# Patient Record
Sex: MALE
[De-identification: names, ages, dates, MRNs, and addresses within clinical notes are randomized per-mention and may not be internally consistent; named-entity substitution may affect disease eponyms.]

---

## 2024-08-14 ENCOUNTER — APPOINTMENT (OUTPATIENT)
Dept: ORTHOPEDIC SURGERY | Facility: CLINIC | Age: 34
End: 2024-08-14
Payer: MEDICAID

## 2024-08-14 VITALS — WEIGHT: 154 LBS | HEIGHT: 67 IN | BODY MASS INDEX: 24.17 KG/M2

## 2024-08-14 DIAGNOSIS — M67.431 GANGLION, RIGHT WRIST: ICD-10-CM

## 2024-08-14 PROBLEM — Z00.00 ENCOUNTER FOR PREVENTIVE HEALTH EXAMINATION: Status: ACTIVE | Noted: 2024-08-14

## 2024-08-14 PROCEDURE — 20612 ASPIRATE/INJ GANGLION CYST: CPT | Mod: RT

## 2024-08-14 PROCEDURE — 99203 OFFICE O/P NEW LOW 30 MIN: CPT | Mod: 25

## 2024-08-14 NOTE — DISCUSSION/SUMMARY
[FreeTextEntry1] : He has findings consistent with right dorsal wrist ganglion cyst.    I had a discussion with the patient regarding today's visit, the prognosis of this diagnosis, and treatment recommendations and options. At this time, I recommended a cyst aspiration today, which he agreed to proceed with.    The patient has agreed to the above plan of management and has expressed full understanding. All questions were fully answered to the patient's satisfaction.   My cumulative time spent on this visit included: Preparation for the visit, review of the medical records, review of pertinent diagnostic studies, examination and counseling of the patient on the above diagnosis, treatment plan and prognosis, orders of diagnostic tests, medication and/or appropriate procedures and documentation in the medical records of today's visit.

## 2024-08-14 NOTE — PROCEDURE
[FreeTextEntry1] : Using sterile technique, the ganglion cyst was aspirated. He was instructed on activity modification. He understands the relatively high risk of recurrence. If it recurs and causes him symptoms, then he will return to the office to discuss further treatment options.

## 2024-08-14 NOTE — HISTORY OF PRESENT ILLNESS
[Right] : right hand dominant [FreeTextEntry1] : He comes in today for evaluation of  right wrist ganglion cyst for the past 2 weeks. He states his pain varies with certain movements. He denies any radiating pain or numbness.

## 2024-08-14 NOTE — ADDENDUM
[FreeTextEntry1] :  I, George Nguyen, acted solely as a scribe for Dr. Kerr on this date on 08/14/2024.

## 2024-08-14 NOTE — PHYSICAL EXAM
[de-identified] : - Constitutional: This is a male in no obvious distress.  - Psych: Patient is alert and oriented to person, place and time.  Patient has a normal mood and affect. - Cardiovascular: Normal pulses throughout the upper extremities.  No significant varicosities are noted in the upper extremities. - Neuro: Strength and sensation are intact throughout the upper extremities.  Patient has normal coordination. - Respiratory:  Patient exhibits no evidence of shortness of breath or difficulty breathing. - Skin: No rashes, lesions, or other abnormalities are noted in the upper extremities. ---  Examination of his right wrist and hand demonstrates a ganglion cyst emanating from the dorsal aspect of the wrist joint.  There is mild tenderness.  He has full motion.  He is neurovascularly intact distally.

## 2024-08-14 NOTE — END OF VISIT
[FreeTextEntry3] : This note was written by George Nguyen on 08/14/2024 acting solely as a scribe for Dr. Mateus Kerr.   All medical record entries made by the Scribe were at my, Dr. Mateus Kerr, direction and personally dictated by me on 08/14/2024. I have personally reviewed the chart and agree that the record accurately reflects my personal performance of the history, physical exam, assessment and plan.